# Patient Record
Sex: MALE | Race: WHITE | URBAN - METROPOLITAN AREA
[De-identification: names, ages, dates, MRNs, and addresses within clinical notes are randomized per-mention and may not be internally consistent; named-entity substitution may affect disease eponyms.]

---

## 2021-02-15 ENCOUNTER — OFFICE VISIT (OUTPATIENT)
Dept: URBAN - METROPOLITAN AREA CLINIC 83 | Facility: CLINIC | Age: 71
End: 2021-02-15
Payer: COMMERCIAL

## 2021-02-15 DIAGNOSIS — H43.811 VITREOUS DEGENERATION, RIGHT EYE: ICD-10-CM

## 2021-02-15 PROCEDURE — 92014 COMPRE OPH EXAM EST PT 1/>: CPT | Performed by: OPHTHALMOLOGY

## 2021-02-15 PROCEDURE — 92134 CPTRZ OPH DX IMG PST SGM RTA: CPT | Performed by: OPHTHALMOLOGY

## 2021-02-15 ASSESSMENT — INTRAOCULAR PRESSURE
OS: 15
OD: 14

## 2021-02-15 NOTE — IMPRESSION/PLAN
Impression: Puckering of macula, right eye: H35.371. OCT OU=stable ERM OD, normal OS Plan: --exam/OCT reveal a mild, stable ERM OD
--findings/diagnosis d/w pt in detail
--options discussed, including observation vs surgical intervention
--rec obs given current VA and minimal hindrance of ADLs
--[employer] grid self-monitoring discussed --pt instructed to call with s/s dec [facility]

RTC 12 months for OCT OU

## 2022-04-04 ENCOUNTER — OFFICE VISIT (OUTPATIENT)
Dept: URBAN - METROPOLITAN AREA CLINIC 86 | Facility: CLINIC | Age: 72
End: 2022-04-04
Payer: COMMERCIAL

## 2022-04-04 DIAGNOSIS — H25.13 AGE-RELATED NUCLEAR CATARACT, BILATERAL: ICD-10-CM

## 2022-04-04 DIAGNOSIS — G45.3 AMAUROSIS FUGAX: ICD-10-CM

## 2022-04-04 DIAGNOSIS — H35.371 PUCKERING OF MACULA, RIGHT EYE: Primary | ICD-10-CM

## 2022-04-04 PROCEDURE — 99213 OFFICE O/P EST LOW 20 MIN: CPT | Performed by: OPHTHALMOLOGY

## 2022-04-04 PROCEDURE — 92134 CPTRZ OPH DX IMG PST SGM RTA: CPT | Performed by: OPHTHALMOLOGY

## 2022-04-04 ASSESSMENT — INTRAOCULAR PRESSURE
OS: 18
OD: 13

## 2022-04-04 NOTE — IMPRESSION/PLAN
Impression: Puckering of macula, right eye: H35.371. OCT: 04/04/22 OD: stable ERM
OS: normal Plan: --exam/OCT reveal a mild, stable ERM OD
--findings/diagnosis d/w pt in detail
--options discussed, including observation vs surgical intervention
--rec obs given current VA and minimal hindrance of ADLs
--[place] grid self-monitoring discussed --pt instructed to call with s/s dec [facility]

RTC 12 months for OCT OU

## 2022-04-04 NOTE — IMPRESSION/PLAN
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 Plan: Pt complains of inferior altitudinal defect OS that lasted few minutes few months ago. Discussed that symptoms are concerning for AF. Discussed that he should be evaluated by PCP to rule out cardiovascular disease. I would recommend carotid ultrasound and possible echo if indicated. Pt will discuss with PCP.

## 2022-04-04 NOTE — IMPRESSION/PLAN
Impression: Vitreous degeneration, right eye: H43.811. Plan: Exam is stable. There are no holes, tears or detachments seen on exam. Reviewed s/s of RD in detail with the patient. The patient was advised to call immediately with any changes to [address] or increase in symptoms.

## 2024-12-13 ENCOUNTER — OFFICE VISIT (OUTPATIENT)
Dept: URBAN - METROPOLITAN AREA CLINIC 86 | Facility: CLINIC | Age: 74
End: 2024-12-13
Payer: COMMERCIAL

## 2024-12-13 DIAGNOSIS — G45.3 AMAUROSIS FUGAX: ICD-10-CM

## 2024-12-13 DIAGNOSIS — H43.811 VITREOUS DEGENERATION, RIGHT EYE: ICD-10-CM

## 2024-12-13 DIAGNOSIS — H25.13 AGE-RELATED NUCLEAR CATARACT, BILATERAL: ICD-10-CM

## 2024-12-13 DIAGNOSIS — H35.371 PUCKERING OF MACULA, RIGHT EYE: Primary | ICD-10-CM

## 2024-12-13 PROCEDURE — 92134 CPTRZ OPH DX IMG PST SGM RTA: CPT | Performed by: OPHTHALMOLOGY

## 2024-12-13 PROCEDURE — 99214 OFFICE O/P EST MOD 30 MIN: CPT | Performed by: OPHTHALMOLOGY

## 2024-12-13 ASSESSMENT — INTRAOCULAR PRESSURE
OS: 10
OD: 10